# Patient Record
Sex: MALE | Race: WHITE
[De-identification: names, ages, dates, MRNs, and addresses within clinical notes are randomized per-mention and may not be internally consistent; named-entity substitution may affect disease eponyms.]

---
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
## 2022-01-18 ENCOUNTER — HOSPITAL ENCOUNTER (EMERGENCY)
Dept: HOSPITAL 76 - ED | Age: 22
Discharge: HOME | End: 2022-01-18
Payer: COMMERCIAL

## 2022-01-18 VITALS — DIASTOLIC BLOOD PRESSURE: 86 MMHG | SYSTOLIC BLOOD PRESSURE: 139 MMHG

## 2022-01-18 DIAGNOSIS — R10.11: Primary | ICD-10-CM

## 2022-01-18 LAB
CLARITY UR REFRACT.AUTO: CLEAR
GLUCOSE UR QL STRIP.AUTO: NEGATIVE MG/DL
KETONES UR QL STRIP.AUTO: NEGATIVE MG/DL
NITRITE UR QL STRIP.AUTO: NEGATIVE
PH UR STRIP.AUTO: 6 PH (ref 5–7.5)
PROT UR STRIP.AUTO-MCNC: NEGATIVE MG/DL
RBC # UR STRIP.AUTO: NEGATIVE /UL
SP GR UR STRIP.AUTO: <=1.005 (ref 1–1.03)
UROBILINOGEN UR QL STRIP.AUTO: (no result) E.U./DL
UROBILINOGEN UR STRIP.AUTO-MCNC: NEGATIVE MG/DL

## 2022-01-18 PROCEDURE — 81003 URINALYSIS AUTO W/O SCOPE: CPT

## 2022-01-18 PROCEDURE — 87086 URINE CULTURE/COLONY COUNT: CPT

## 2022-01-18 PROCEDURE — 99283 EMERGENCY DEPT VISIT LOW MDM: CPT

## 2022-01-18 PROCEDURE — 81001 URINALYSIS AUTO W/SCOPE: CPT

## 2022-01-18 NOTE — ED PHYSICIAN DOCUMENTATION
PD HPI ABD PAIN





- Stated complaint


Stated Complaint: ABD PX





- Chief complaint


Chief Complaint: Abd Pain





- History obtained from


History obtained from: Patient





- Additional information


Additional information: 





21-year-old has had intermittent right upper quadrant pain better after eating 

for the last 2 weeks.  He is worried about his liver function.  He had been 

drinking but quit recently.  He has some intermittent marijuana use.  No weight 

loss.





Review of Systems


Constitutional: denies: Fever, Chills, Weight Loss


GI: reports: Abdominal Pain.  denies: Nausea, Vomiting, Constipation, Diarrhea, 

Hematemesis


: denies: Dysuria





PD PAST MEDICAL HISTORY





- Past Medical History


Past Medical History: No


Cardiovascular: None


Respiratory: None


Neuro: None


Endocrine/Autoimmune: None


GI: None


: None


HEENT: None


Psych: None


Musculoskeletal: None


Derm: None





- Past Surgical History


Past Surgical History: No





- Present Medications


Home Medications: 


                                Ambulatory Orders











 Medication  Instructions  Recorded  Confirmed


 


HYDROcod/ACETAM 5/325 [Norco 5/325] 0.5 - 1 ea PO Q6H PRN #10 tablet 10/22/15 


 


Ofloxacin 0.3% Ophth Drops 1 drops OPTH QID #1 btl 10/22/15 





[Ocuflox 0.3% Ophth Drops]   


 


prednisoLONE 1% OPHTH DROPS [Pred 1 drops OPTH QID #1 bottle 10/22/15 





Forte 1% Ophth Drops]   


 


Omeprazole 40 mg PO DAILY #30 cap 01/18/22 














- Allergies


Allergies/Adverse Reactions: 


                                    Allergies











Allergy/AdvReac Type Severity Reaction Status Date / Time


 


No Known Drug Allergies Allergy   Verified 01/18/22 20:30














- Social History


Does the pt smoke?: No


Smoking Status: Never smoker


Does the pt drink ETOH?: No


Does the pt have substance abuse?: No





- Immunizations


Immunizations are current?: No


Immunizations: No immun





- POLST


Patient has POLST: No





PD ED PE NORMAL





- Vitals


Vital signs reviewed: Yes





- General


General: Alert and oriented X 3, No acute distress





- HEENT


HEENT: Other (Anicteric sclera)





- Abdomen


Abdomen: Normal bowel sounds, Soft, Non tender, Other (Bedside ultrasound shows 

normal extra echotexture of the liver and gallbladder is structurally normal 

without stones or thickening or pericholecystic fluid.)





- Back


Back: No CVA TTP, No spinal TTP





- Derm


Derm: Normal color, Warm and dry





- Extremities


Extremities: No edema, No calf tenderness / cord





- Neuro


Neuro: Alert and oriented X 3, Normal speech





Results





- Vitals


Vitals: 


                               Vital Signs - 24 hr











  01/18/22





  20:28


 


Temperature 36.6 C


 


Heart Rate 75


 


Respiratory 15





Rate 


 


Blood Pressure 132/90 H


 


O2 Saturation 100








                                     Oxygen











O2 Source                      Room air

















- Labs


Labs: 


                                Laboratory Tests











  01/18/22





  20:20


 


Urine Color  YELLOW


 


Urine Clarity  CLEAR


 


Urine pH  6.0


 


Ur Specific Gravity  <=1.005


 


Urine Protein  NEGATIVE


 


Urine Glucose (UA)  NEGATIVE


 


Urine Ketones  NEGATIVE


 


Urine Occult Blood  NEGATIVE


 


Urine Nitrite  NEGATIVE


 


Urine Bilirubin  NEGATIVE


 


Urine Urobilinogen  0.2 (NORMAL)


 


Ur Leukocyte Esterase  NEGATIVE


 


Ur Microscopic Review  NOT INDICATED


 


Urine Culture Comments  NOT INDICATED














PD MEDICAL DECISION MAKING





- ED course


ED course: 





21-year-old presents with right upper quadrant pain better after eating, and a 

benign abdomen and unremarkable bedside ultrasound.  I recommended blood work 

which he declined.





Departure





- Departure


Disposition: 01 Home, Self Care


Clinical Impression: 


Abdominal pain


Qualifiers:


 Abdominal location: right upper quadrant Qualified Code(s): R10.11 - Right 

upper quadrant pain





Condition: Good


Record reviewed to determine appropriate education?: Yes


Instructions:  Abdominal Pain


Prescriptions: 


Omeprazole 40 mg PO DAILY #30 cap


Comments: 


Examination is normal as is a bedside ultrasound of your liver and gallbladder. 

Return for new or worsening symptoms.  Try the antacids for a few days.  Also 

return if you change your mind about wanting blood work.  Follow-up with your 

doctor, next available appointment.

## 2022-01-21 ENCOUNTER — HOSPITAL ENCOUNTER (OUTPATIENT)
Dept: HOSPITAL 76 - EMS | Age: 22
Discharge: ANOTHER HOSPITAL | End: 2022-01-21
Payer: COMMERCIAL

## 2022-01-21 ENCOUNTER — HOSPITAL ENCOUNTER (EMERGENCY)
Dept: HOSPITAL 76 - ED | Age: 22
Discharge: HOME | End: 2022-01-21
Payer: COMMERCIAL

## 2022-01-21 VITALS — SYSTOLIC BLOOD PRESSURE: 122 MMHG | DIASTOLIC BLOOD PRESSURE: 95 MMHG

## 2022-01-21 DIAGNOSIS — R10.11: Primary | ICD-10-CM

## 2022-01-21 DIAGNOSIS — R11.2: ICD-10-CM

## 2022-01-21 DIAGNOSIS — R19.7: ICD-10-CM

## 2022-01-21 DIAGNOSIS — Z76.89: Primary | ICD-10-CM

## 2022-01-21 LAB
ALBUMIN DIAFP-MCNC: 4.6 G/DL (ref 3.2–5.5)
ALBUMIN/GLOB SERPL: 1.5 {RATIO} (ref 1–2.2)
ALP SERPL-CCNC: 80 IU/L (ref 42–121)
ALT SERPL W P-5'-P-CCNC: 15 IU/L (ref 10–60)
ANION GAP SERPL CALCULATED.4IONS-SCNC: 9 MMOL/L (ref 6–13)
AST SERPL W P-5'-P-CCNC: 14 IU/L (ref 10–42)
BASOPHILS NFR BLD AUTO: 0.1 10^3/UL (ref 0–0.1)
BASOPHILS NFR BLD AUTO: 1 %
BILIRUB BLD-MCNC: 0.7 MG/DL (ref 0.2–1)
BUN SERPL-MCNC: 9 MG/DL (ref 6–20)
CALCIUM UR-MCNC: 9.5 MG/DL (ref 8.5–10.3)
CHLORIDE SERPL-SCNC: 102 MMOL/L (ref 101–111)
CO2 SERPL-SCNC: 25 MMOL/L (ref 21–32)
CREAT SERPLBLD-SCNC: 0.7 MG/DL (ref 0.6–1.2)
EOSINOPHIL # BLD AUTO: 0.1 10^3/UL (ref 0–0.7)
EOSINOPHIL NFR BLD AUTO: 0.9 %
ERYTHROCYTE [DISTWIDTH] IN BLOOD BY AUTOMATED COUNT: 14.6 % (ref 12–15)
GFRSERPLBLD MDRD-ARVRAT: 145 ML/MIN/{1.73_M2} (ref 89–?)
GLOBULIN SER-MCNC: 3 G/DL (ref 2.1–4.2)
GLUCOSE SERPL-MCNC: 98 MG/DL (ref 70–100)
HCT VFR BLD AUTO: 36.9 % (ref 42–52)
HGB UR QL STRIP: 11.6 G/DL (ref 14–18)
LIPASE SERPL-CCNC: 21 U/L (ref 22–51)
LYMPHOCYTES # SPEC AUTO: 1.3 10^3/UL (ref 1.5–3.5)
LYMPHOCYTES NFR BLD AUTO: 16.2 %
MCH RBC QN AUTO: 19.2 PG (ref 27–31)
MCHC RBC AUTO-ENTMCNC: 31.4 G/DL (ref 32–36)
MCV RBC AUTO: 61 FL (ref 80–94)
MONOCYTES # BLD AUTO: 0.7 10^3/UL (ref 0–1)
MONOCYTES NFR BLD AUTO: 8.4 %
NEUTROPHILS # BLD AUTO: 6 10^3/UL (ref 1.5–6.6)
NEUTROPHILS # SNV AUTO: 8.2 X10^3/UL (ref 4.8–10.8)
NEUTROPHILS NFR BLD AUTO: 73.1 %
NRBC # BLD AUTO: 0 /100WBC
NRBC # BLD AUTO: 0 X10^3/UL
PDW BLD AUTO: 10.7 FL (ref 7.4–11.4)
PLAT MORPH BLD: (no result)
PLATELET # BLD: 299 10^3/UL (ref 130–450)
PLATELET BLD QL SMEAR: (no result)
POTASSIUM SERPL-SCNC: 3.8 MMOL/L (ref 3.5–5)
PROT SPEC-MCNC: 7.6 G/DL (ref 6.7–8.2)
RBC MAR: 6.05 10^6/UL (ref 4.7–6.1)
RBC MORPH BLD: (no result)
SODIUM SERPLBLD-SCNC: 136 MMOL/L (ref 135–145)

## 2022-01-21 PROCEDURE — 99282 EMERGENCY DEPT VISIT SF MDM: CPT

## 2022-01-21 PROCEDURE — 80053 COMPREHEN METABOLIC PANEL: CPT

## 2022-01-21 PROCEDURE — 99283 EMERGENCY DEPT VISIT LOW MDM: CPT

## 2022-01-21 PROCEDURE — 83690 ASSAY OF LIPASE: CPT

## 2022-01-21 PROCEDURE — 85025 COMPLETE CBC W/AUTO DIFF WBC: CPT

## 2022-01-21 PROCEDURE — 36415 COLL VENOUS BLD VENIPUNCTURE: CPT

## 2022-01-21 NOTE — ED PHYSICIAN DOCUMENTATION
PD HPI ABD PAIN





- Stated complaint


Stated Complaint: NAUSEA,ABD PAIN





- Chief complaint


Chief Complaint: Abd Pain





- History obtained from


History obtained from: Patient





- History of Present Illness


Timing - onset: How many weeks ago (2-3)


Timing - details: Intermittant


Pain level max: 4


Pain level now: 2


Quality: Pain


Location: RUQ


Radiation: Other (no radiation)


Improved by: Other (no ameliorating factors)


Worsened by: Other (no exacerbating factors)


Associated symptoms: Nausea.  No: Hematuria


Similar symptoms before: No diagnosis


Recently seen: Emergency Dept





- Additional information


Additional information: 





c/o 2-3 weeks of intermittent RUQ pain, nausea without vomiting, diarrhea. He 

says his eyes have been off and on yellow. He was evaluated in this ED 3 days 

ago but declined blood tests. At that time, he had a bedside US by [facility] that as 

unremarkable. He return due to ongoing symptoms (not worsening, but he says he 

was told to come back to ED if his symptoms persist). He has not yet filled the 

rx for PPI provided on previous visit.





Review of Systems


Constitutional: reports: Reviewed and negative


Cardiac: reports: Reviewed and negative


Respiratory: reports: Reviewed and negative


GI: reports: Abdominal Pain, Nausea, Diarrhea.  denies: Vomiting, Constipation


: denies: Dysuria, Frequency





PD PAST MEDICAL HISTORY





- Past Medical History


Cardiovascular: None


Respiratory: None


Neuro: None


Endocrine/Autoimmune: None


GI: None


: None


HEENT: None


Psych: None


Musculoskeletal: None


Derm: None





- Past Surgical History


Past Surgical History: No





- Present Medications


Home Medications: 


                                Ambulatory Orders











 Medication  Instructions  Recorded  Confirmed


 


HYDROcod/ACETAM 5/325 [Norco 5/325] 0.5 - 1 ea PO Q6H PRN #10 tablet 10/22/15 


 


Ofloxacin 0.3% Ophth Drops 1 drops OPTH QID #1 btl 10/22/15 





[Ocuflox 0.3% Ophth Drops]   


 


prednisoLONE 1% OPHTH DROPS [Pred 1 drops OPTH QID #1 bottle 10/22/15 





Forte 1% Ophth Drops]   


 


Omeprazole 40 mg PO DAILY #30 cap 01/18/22 














- Allergies


Allergies/Adverse Reactions: 


                                    Allergies











Allergy/AdvReac Type Severity Reaction Status Date / Time


 


No Known Drug Allergies Allergy   Verified 01/21/22 21:27














- Social History


Does the pt smoke?: No


Smoking Status: Never smoker


Does the pt drink ETOH?: No


Does the pt have substance abuse?: No





- Immunizations


Immunizations are current?: No


Immunizations: No immun





- POLST


Patient has POLST: No





PD ED PE NORMAL





- Vitals


Vital signs reviewed: Yes





- General


General: Alert and oriented X 3, No acute distress, Well developed/nourished





- HEENT


HEENT: Moist mucous membranes





- Neck


Neck: Supple, no meningeal sign





- Cardiac


Cardiac: RRR, No murmur





- Respiratory


Respiratory: No respiratory distress, Clear bilaterally





- Abdomen


Abdomen: Normal bowel sounds, Soft, Non tender, Non distended





- Back


Back: No CVA TTP





- Derm


Derm: Normal color





PD ED PE EXPANDED





- Eyes


Eyes: No: Scleral icterus





Results





- Vitals


Vitals: 


                                     Oxygen











O2 Source                      Room air

















- Labs


Labs: 


                                Laboratory Tests











  01/21/22 01/21/22





  21:45 21:45


 


WBC  8.2 


 


RBC  6.05 


 


Hgb  11.6 L 


 


Hct  36.9 L 


 


MCV  61.0 L 


 


MCH  19.2 L 


 


MCHC  31.4 L 


 


RDW  14.6 


 


Plt Count  299 


 


MPV  10.7 


 


Neut # (Auto)  6.0 


 


Lymph # (Auto)  1.3 L 


 


Mono # (Auto)  0.7 


 


Eos # (Auto)  0.1 


 


Baso # (Auto)  0.1 


 


Absolute Nucleated RBC  0.00 


 


Nucleated RBC %  0.0 


 


Platelet Estimate  NORMAL (130-450,000) 


 


Platelet Morphology  NORMAL APPEARANCE 


 


RBC Morph Micro Appear  2+ MICROCYTOSIS 


 


Sodium   136


 


Potassium   3.8


 


Chloride   102


 


Carbon Dioxide   25


 


Anion Gap   9.0


 


BUN   9


 


Creatinine   0.7


 


Estimated GFR (MDRD)   145


 


Glucose   98


 


Calcium   9.5


 


Total Bilirubin   0.7


 


AST   14


 


ALT   15


 


Alkaline Phosphatase   80


 


Total Protein   7.6


 


Albumin   4.6


 


Globulin   3.0


 


Albumin/Globulin Ratio   1.5


 


Lipase   21 L














PD MEDICAL DECISION MAKING





- ED course


Complexity details: reviewed results, re-evaluated patient, considered 

differential, d/w patient


ED course: 





NAD and unremarkable blood tests (CBC with differential, ER abdominal panel). 

Benign abdominal exam. Further testing is not indicated at this time. Results 

reviewed with patient. I emphasized the need for establishing with local primary

care provider. I also encouraged him to fill the rx for the PPI and take it as 

prescribed; gastritis, PUD are certainly on the differential for some of his 

symptoms. I encouraged him to return to the ED if worse, or if new concerning 

signs/symptoms develop, but that lack of resolution of symptoms that have been 

evaluated in ED are typically best reevaluated in outpatient setting.





Departure





- Departure


Disposition: 01 Home, Self Care


Clinical Impression: 


 Abdominal pain





Condition: Good


Instructions:  ED Abdominal Pain Unkn Cause Male


Follow-Up: 


Camron Rosales MD [Provider Admit Priv/Credential] - 


Comments: 


The tests performed tonight are unremarkable. The cause of your symptoms is not 

apparent at this time. You might benefit from further testing in the outpatient 

setting, particularly if your symptoms persist. You should work on establishing 

with a local primary care provider as we discussed. I also recommend filling the

prescription you were provided on your recent previous visit (omeprazole) and 

taking it as prescribed. 


Discharge Date/Time: 01/21/22 23:15

## 2022-01-24 ENCOUNTER — HOSPITAL ENCOUNTER (OUTPATIENT)
Dept: HOSPITAL 76 - LAB.N | Age: 22
Discharge: HOME | End: 2022-01-24
Attending: NURSE PRACTITIONER
Payer: COMMERCIAL

## 2022-01-24 DIAGNOSIS — R10.9: Primary | ICD-10-CM

## 2022-01-24 LAB
BILIRUB UR QL CFM: NEGATIVE
C TRACH DNA SPEC NAA+PROBE-ACNC: NEGATIVE
CLARITY UR REFRACT.AUTO: (no result)
GLUCOSE UR QL STRIP.AUTO: NEGATIVE MG/DL
KETONES UR QL STRIP.AUTO: NEGATIVE MG/DL
N GONORRHOEA DNA GENITAL QL NAA+PROBE: NEGATIVE
NITRITE UR QL STRIP.AUTO: NEGATIVE
PH UR STRIP.AUTO: 6 PH (ref 5–7.5)
PROT UR STRIP.AUTO-MCNC: (no result) MG/DL
RBC # UR STRIP.AUTO: NEGATIVE /UL
SP GR UR STRIP.AUTO: >=1.03 (ref 1–1.03)
SQUAMOUS URNS QL MICRO: (no result)
T VAGINALIS RRNA GENITAL QL PROBE: (no result)
UROBILINOGEN UR QL STRIP.AUTO: (no result) E.U./DL
UROBILINOGEN UR STRIP.AUTO-MCNC: NEGATIVE MG/DL
WBC # UR MANUAL: (no result) /HPF (ref 0–3)

## 2022-01-24 PROCEDURE — 87661 TRICHOMONAS VAGINALIS AMPLIF: CPT

## 2022-01-24 PROCEDURE — 86592 SYPHILIS TEST NON-TREP QUAL: CPT

## 2022-01-24 PROCEDURE — 87086 URINE CULTURE/COLONY COUNT: CPT

## 2022-01-24 PROCEDURE — 87389 HIV-1 AG W/HIV-1&-2 AB AG IA: CPT

## 2022-01-24 PROCEDURE — 86695 HERPES SIMPLEX TYPE 1 TEST: CPT

## 2022-01-24 PROCEDURE — 87491 CHLMYD TRACH DNA AMP PROBE: CPT

## 2022-01-24 PROCEDURE — 86803 HEPATITIS C AB TEST: CPT

## 2022-01-24 PROCEDURE — 87591 N.GONORRHOEAE DNA AMP PROB: CPT

## 2022-01-24 PROCEDURE — 81001 URINALYSIS AUTO W/SCOPE: CPT

## 2022-01-24 PROCEDURE — 86696 HERPES SIMPLEX TYPE 2 TEST: CPT

## 2022-01-24 PROCEDURE — 81599 UNLISTED MAAA: CPT

## 2022-01-25 LAB
HEPATITIS C ANTIBODY: (no result)
HIV AG/AB 4TH GEN: (no result)
SIGNAL TO CUT-OFF: 0.1 (ref ?–1)

## 2022-01-26 LAB
HSV 1 IGG TYPE SPECIFIC AB: <0.9 INDEX
HSV 2 IGG TYPE SPECIFIC AB: <0.9 INDEX